# Patient Record
Sex: FEMALE | Race: AMERICAN INDIAN OR ALASKA NATIVE
[De-identification: names, ages, dates, MRNs, and addresses within clinical notes are randomized per-mention and may not be internally consistent; named-entity substitution may affect disease eponyms.]

---

## 2022-08-22 ENCOUNTER — HOSPITAL ENCOUNTER (OUTPATIENT)
Dept: HOSPITAL 5 - TRG | Age: 32
LOS: 1 days | Discharge: HOME | End: 2022-08-23
Attending: OBSTETRICS & GYNECOLOGY
Payer: MEDICAID

## 2022-08-22 DIAGNOSIS — Z3A.37: ICD-10-CM

## 2022-08-22 DIAGNOSIS — Z34.93: Primary | ICD-10-CM

## 2022-08-22 LAB
ALBUMIN SERPL-MCNC: 3.5 G/DL (ref 3.9–5)
BACTERIA #/AREA URNS HPF: (no result) /HPF
BASOPHILS # (AUTO): 0 K/MM3 (ref 0–0.1)
BASOPHILS NFR BLD AUTO: 0.4 % (ref 0–1.8)
BUN SERPL-MCNC: 8 MG/DL (ref 7–17)
CALCIUM SERPL-MCNC: 8.9 MG/DL (ref 8.4–10.2)
CREATININE,URINE: 122.4 MG/DL (ref 0.1–20)
EOSINOPHIL # BLD AUTO: 0.1 K/MM3 (ref 0–0.4)
EOSINOPHIL NFR BLD AUTO: 1 % (ref 0–4.3)
HCT VFR BLD CALC: 33.5 % (ref 30.3–42.9)
HEMOLYSIS INDEX: 14
HGB BLD-MCNC: 11.9 GM/DL (ref 10.1–14.3)
LYMPHOCYTES # BLD AUTO: 2.4 K/MM3 (ref 1.2–5.4)
LYMPHOCYTES NFR BLD AUTO: 31.6 % (ref 13.4–35)
MCHC RBC AUTO-ENTMCNC: 36 % (ref 30–34)
MCV RBC AUTO: 82 FL (ref 79–97)
MONOCYTES # (AUTO): 0.7 K/MM3 (ref 0–0.8)
MONOCYTES % (AUTO): 9.7 % (ref 0–7.3)
MUCOUS THREADS #/AREA URNS HPF: (no result) /HPF
PLATELET # BLD: 161 K/MM3 (ref 140–440)
RBC # BLD AUTO: 4.11 M/MM3 (ref 3.65–5.03)
RBC #/AREA URNS HPF: < 1 /HPF (ref 0–6)
WBC #/AREA URNS HPF: 1 /HPF (ref 0–6)

## 2022-08-22 PROCEDURE — 81001 URINALYSIS AUTO W/SCOPE: CPT

## 2022-08-22 PROCEDURE — 80053 COMPREHEN METABOLIC PANEL: CPT

## 2022-08-22 PROCEDURE — 84550 ASSAY OF BLOOD/URIC ACID: CPT

## 2022-08-22 PROCEDURE — 76816 OB US FOLLOW-UP PER FETUS: CPT

## 2022-08-22 PROCEDURE — 85025 COMPLETE CBC W/AUTO DIFF WBC: CPT

## 2022-08-22 PROCEDURE — 82570 ASSAY OF URINE CREATININE: CPT

## 2022-08-22 PROCEDURE — 36415 COLL VENOUS BLD VENIPUNCTURE: CPT

## 2022-08-22 PROCEDURE — 59025 FETAL NON-STRESS TEST: CPT

## 2022-08-22 PROCEDURE — 84156 ASSAY OF PROTEIN URINE: CPT

## 2022-08-22 NOTE — ULTRASOUND REPORT
US OB follow up



INDICATION / CLINICAL INFORMATION: Contractions, Pain clinical indication for estimated fetal weight 
and amniotic fluid index.



COMPARISON: None available.



TECHNIQUE: Using a transcutaneous probe, multiple grayscale, color Doppler, and spectral Doppler imag
es of the uterus and fetus were captured and stored.

 

FINDINGS:



A single cephalic fetus is present. Heart rate 161 bpm.



Amniotic fluid index is within normal limits measuring 9.5 cm.



Biparietal Diameter = 9.7 cm = 39, 4 weeks, days

Head Circumference = 34.5 cm = 39, 6 weeks, days

Abdominal Circumference = 33.5 cm = 37, 2 weeks, days

Femur Length = 6.8 cm = 35, 1 weeks, days

Average Ultrasound Age (AUA) = 38, 0 weeks, days. EDC 9/5/2022.



Clinical history gestational age based on LMP of 11/30/2021 is 37 weeks 6 days.



Estimated fetal weight = 3182 g. Growth percentile 48%..



IMPRESSION:

1. Single living fetus with estimated fetal weight and normal amniotic fluid index as detailed.



Signer Name: Shan Oreilly II, MD 

Signed: 8/22/2022 11:31 PM

Workstation Name: [id number]

## 2022-08-23 VITALS — SYSTOLIC BLOOD PRESSURE: 111 MMHG | DIASTOLIC BLOOD PRESSURE: 60 MMHG

## 2022-08-23 LAB
ALT SERPL-CCNC: 12 UNITS/L (ref 7–56)
BUN/CREAT SERPL: 11 %

## 2022-08-31 ENCOUNTER — HOSPITAL ENCOUNTER (INPATIENT)
Dept: HOSPITAL 5 - TRG | Age: 32
LOS: 2 days | Discharge: HOME | End: 2022-09-02
Attending: OBSTETRICS & GYNECOLOGY | Admitting: OBSTETRICS & GYNECOLOGY
Payer: MEDICAID

## 2022-08-31 DIAGNOSIS — Z20.822: ICD-10-CM

## 2022-08-31 DIAGNOSIS — Z3A.39: ICD-10-CM

## 2022-08-31 LAB
ALT SERPL-CCNC: 10 UNITS/L (ref 7–56)
BACTERIA #/AREA URNS HPF: (no result) /HPF
HCT VFR BLD CALC: 34.1 % (ref 30.3–42.9)
HGB BLD-MCNC: 12.2 GM/DL (ref 10.1–14.3)
MCHC RBC AUTO-ENTMCNC: 36 % (ref 30–34)
MCV RBC AUTO: 82 FL (ref 79–97)
MUCOUS THREADS #/AREA URNS HPF: (no result) /HPF
PLATELET # BLD: 175 K/MM3 (ref 140–440)
RBC # BLD AUTO: 4.15 M/MM3 (ref 3.65–5.03)
RBC #/AREA URNS HPF: 2 /HPF (ref 0–6)
URATE SERPL-MCNC: 4.3 MG/DL (ref 3.5–7.6)
WBC #/AREA URNS HPF: 2 /HPF (ref 0–6)

## 2022-08-31 PROCEDURE — 85018 HEMOGLOBIN: CPT

## 2022-08-31 PROCEDURE — U0003 INFECTIOUS AGENT DETECTION BY NUCLEIC ACID (DNA OR RNA); SEVERE ACUTE RESPIRATORY SYNDROME CORONAVIRUS 2 (SARS-COV-2) (CORONAVIRUS DISEASE [COVID-19]), AMPLIFIED PROBE TECHNIQUE, MAKING USE OF HIGH THROUGHPUT TECHNOLOGIES AS DESCRIBED BY CMS-2020-01-R: HCPCS

## 2022-08-31 PROCEDURE — 85027 COMPLETE CBC AUTOMATED: CPT

## 2022-08-31 PROCEDURE — 86901 BLOOD TYPING SEROLOGIC RH(D): CPT

## 2022-08-31 PROCEDURE — 36415 COLL VENOUS BLD VENIPUNCTURE: CPT

## 2022-08-31 PROCEDURE — 83615 LACTATE (LD) (LDH) ENZYME: CPT

## 2022-08-31 PROCEDURE — 84550 ASSAY OF BLOOD/URIC ACID: CPT

## 2022-08-31 PROCEDURE — 84460 ALANINE AMINO (ALT) (SGPT): CPT

## 2022-08-31 PROCEDURE — 85014 HEMATOCRIT: CPT

## 2022-08-31 PROCEDURE — 82570 ASSAY OF URINE CREATININE: CPT

## 2022-08-31 PROCEDURE — 84450 TRANSFERASE (AST) (SGOT): CPT

## 2022-08-31 PROCEDURE — 76816 OB US FOLLOW-UP PER FETUS: CPT

## 2022-08-31 PROCEDURE — 82565 ASSAY OF CREATININE: CPT

## 2022-08-31 PROCEDURE — 84156 ASSAY OF PROTEIN URINE: CPT

## 2022-08-31 PROCEDURE — 86900 BLOOD TYPING SEROLOGIC ABO: CPT

## 2022-08-31 PROCEDURE — 86850 RBC ANTIBODY SCREEN: CPT

## 2022-08-31 PROCEDURE — 59200 INSERT CERVICAL DILATOR: CPT

## 2022-08-31 PROCEDURE — 81001 URINALYSIS AUTO W/SCOPE: CPT

## 2022-08-31 NOTE — ULTRASOUND REPORT
ULTRASOUND OBSTETRIC 



Indication:

Fetal well-being. Maternal headache.



COMPARISON: 8/22/2022



Findings:

There is a single intrauterine pregnancy. 



BPD = 9.5 cm = 38 weeks, 5 day(s).

Head circumference = 34.5 cm = 39 weeks, 6 day(s).

Abdominal circumference = 36.0 cm = 40 weeks, 0 day(s).

Femur length = 7.0 cm = 36 weeks, 2 day(s).



Overall estimated sonographic age = 38 weeks, 5 day(s).



Fetal heart rate is 160  beats per minute. 

Estimated fetal birth weight is 3669  grams



Fetal position is cephalic. 





Placenta is posterior  . 

Amniotic fluid volume appears normal. 





Impression:

1. Single living intrauterine pregnancy with estimated sonographic age of 38 weeks, 5  day(s).

2. No sonographic abnormality identified.



Signer Name: Blue Alvarado MD 

Signed: 8/31/2022 11:41 PM

Workstation Name: [employer]

## 2022-09-01 LAB
CREATININE,URINE: 180 MG/DL (ref 0.1–20)
HCT VFR BLD CALC: 31.7 % (ref 30.3–42.9)
HGB BLD-MCNC: 11 GM/DL (ref 10.1–14.3)
MCHC RBC AUTO-ENTMCNC: 35 % (ref 30–34)
MCV RBC AUTO: 83 FL (ref 79–97)
PLATELET # BLD: 150 K/MM3 (ref 140–440)
RBC # BLD AUTO: 3.82 M/MM3 (ref 3.65–5.03)

## 2022-09-01 PROCEDURE — 3E0R3BZ INTRODUCTION OF ANESTHETIC AGENT INTO SPINAL CANAL, PERCUTANEOUS APPROACH: ICD-10-PCS | Performed by: OBSTETRICS & GYNECOLOGY

## 2022-09-01 PROCEDURE — 10907ZC DRAINAGE OF AMNIOTIC FLUID, THERAPEUTIC FROM PRODUCTS OF CONCEPTION, VIA NATURAL OR ARTIFICIAL OPENING: ICD-10-PCS | Performed by: OBSTETRICS & GYNECOLOGY

## 2022-09-01 PROCEDURE — 00HU33Z INSERTION OF INFUSION DEVICE INTO SPINAL CANAL, PERCUTANEOUS APPROACH: ICD-10-PCS | Performed by: OBSTETRICS & GYNECOLOGY

## 2022-09-01 RX ADMIN — SODIUM CHLORIDE, SODIUM LACTATE, POTASSIUM CHLORIDE, AND CALCIUM CHLORIDE SCH MLS/HR: .6; .31; .03; .02 INJECTION, SOLUTION INTRAVENOUS at 04:10

## 2022-09-01 RX ADMIN — SODIUM CHLORIDE, SODIUM LACTATE, POTASSIUM CHLORIDE, AND CALCIUM CHLORIDE SCH MLS/HR: .6; .31; .03; .02 INJECTION, SOLUTION INTRAVENOUS at 06:39

## 2022-09-01 RX ADMIN — SODIUM CHLORIDE, SODIUM LACTATE, POTASSIUM CHLORIDE, AND CALCIUM CHLORIDE SCH MLS/HR: .6; .31; .03; .02 INJECTION, SOLUTION INTRAVENOUS at 09:03

## 2022-09-01 RX ADMIN — DOCUSATE SODIUM SCH MG: 100 CAPSULE, LIQUID FILLED ORAL at 23:11

## 2022-09-01 RX ADMIN — SENNOSIDES AND DOCUSATE SODIUM SCH TAB: 50; 8.6 TABLET ORAL at 18:40

## 2022-09-01 RX ADMIN — SODIUM CHLORIDE, SODIUM LACTATE, POTASSIUM CHLORIDE, AND CALCIUM CHLORIDE SCH MLS/HR: .6; .31; .03; .02 INJECTION, SOLUTION INTRAVENOUS at 04:34

## 2022-09-01 RX ADMIN — SODIUM CHLORIDE, SODIUM LACTATE, POTASSIUM CHLORIDE, AND CALCIUM CHLORIDE SCH MLS/HR: .6; .31; .03; .02 INJECTION, SOLUTION INTRAVENOUS at 08:05

## 2022-09-01 RX ADMIN — IBUPROFEN SCH MG: 600 TABLET, FILM COATED ORAL at 23:11

## 2022-09-01 RX ADMIN — IBUPROFEN SCH: 600 TABLET, FILM COATED ORAL at 18:44

## 2022-09-01 NOTE — ANESTHESIA CONSULTATION
Anesthesia Consult and Med Hx


Date of service: 09/01/22





- Airway


Anesthetic Teeth Evaluation: Good


ROM Head & Neck: Adequate


Mental/Hyoid Distance: Adequate


Mallampati Class: Class II


Intubation Access Assessment: Probably Good





- Pulmonary Exam


CTA: Yes





- Cardiac Exam


Cardiac Exam: RRR





- Pre-Operative Health Status


ASA Pre-Surgery Classification: ASA2


Proposed Anesthetic Plan: Epidural





- Pulmonary


Hx Smoking: No


Hx Asthma: No


Hx Respiratory Symptoms: No


SOB: No


COPD: No


Home Oxygen Therapy: No


Hx Pneumonia: No


Hx Sleep Apnea: No





- Cardiovascular System


Hx Hypertension: No


Hx Coronary Artery Disease: No


Hx Heart Attack/AMI: No


Hx Angina: No


Hx Percutaneous Transluminal Coronary Angioplasty (PTCA): No


Hx Cardia Arrhythmia: No


Hx Pacemaker: No


Hx Internal Defibrillator: No


Hx Valvular Heart Disease: No


Hx Heart Murmur: No


Hx Peripheral Vascular Disease: No





- Central Nervous System


Hx Neuromuscular Disorder: No


Hx Seizures: No


CVA: No


Hx Psychiatric Problems: No





- Gastrointestinal


Hx Ulcer: No


Hx Gastroesophageal Reflux Disease: No





- Endocrine


Hx Renal Disease: No


Hx End Stage Renal Disease: No


Hx Cirrhosis: No


Hx Liver Disease: No


Hx Insulin Dependent Diabetes: No


Hx Non-Insulin Dependent Diabetes: No


Hx Thyroid Disease: No


Hx Hypothyroidism: No


Hx Hyperthyroidism: No





- Hematic


Hx Anemia: No


Hx Sickle Cell Disease: No





- Other Systems


Hx Alcohol Use: No


Hx Substance Use: No


Hx Cancer: No


Hx Obesity: No

## 2022-09-01 NOTE — ANESTHESIA DAY OF SURGERY
Anesthesia Day of Surgery





- Day of Surgery


Patient Examined: Yes


Patient H&P Reviewed: Yes


Patient is NPO: Yes


Beta Blockers: No


Cardiac Clearance: No


Pulmonary Clearance: No


Freddy's Test: N/A

## 2022-09-01 NOTE — EVENT NOTE
Date: 09/01/22





Patient is now post epidural, states much more comfortable now. VE:7/70/-1 

posterior. AROM clr with some old blood noted.

## 2022-09-01 NOTE — HISTORY AND PHYSICAL REPORT
History of Present Illness


Date of examination: 22


Date of admission: 


2022


Chief complaint: 





Blurry vision


History of present illness: 





31 y/o  at 39-2/7 weeks presents to OB triage reports that she had 

blurry vision earlier today and called her OB/GYN office at [facility] who advised her to go to [facility] for further 

evaluation management.





She denies headaches, diplopia, right upper quadrant pain, or scotomata.  She 

denies vaginal bleeding, leaking of fluid, or contractions.  There is good fetal

movement.





The patient had a blood pressure reading >=140/90 during a triage visit on 

2022.  Today in OB triage, the patient had a blood pressure reading 

>=140/90.  Urine protein creatinine ratio was normal and estimated 24 urine 

protein was 161 mg.





As such, this fulfills the contemporary criteria for gestational hypertension.  

Therefore, induction of labor is medically indicated at this gestational age per

ACOG Committee Opinion 831.





The patient is admitted to labor and delivery for induction of labor secondary 

to gestational hypertension.





Past History


Past Medical History: no pertinent history


Past Surgical History: no surgical history


Family/Genetic History: none


Social history: no significant social history





- Obstetrical History


Expected Date of Delivery: 22


Actual Gestation: 39 Week(s) 2 Day(s) 


: 4


Para: 2


Hx # Term Pregnancies: 2


Number of  Pregnancies: 0


Spontaneous Abortions: 1


Induced : 0


Number of Living Children: 2





Medications and Allergies


                                    Allergies











Allergy/AdvReac Type Severity Reaction Status Date / Time


 


No Known Allergies Allergy   Verified 22 22:13














- Vital Signs


Vital signs: 


                                   Vital Signs











Pulse BP


 


 90   138/89 


 


 22 18:40  22 18:40








                                        











Temp Pulse Resp BP Pulse Ox


 


 98.5 F   88   18   126/75   100 


 


 22 18:42  22 00:43  22 18:42  22 00:43  22 00:39














- Physical Exam


Breasts: Positive: normal


Cardiovascular: Regular rate


Lungs: Positive: Normal air movement


Abdomen: Positive: normal appearance


Genitourinary (Female): Positive: normal external genitalia, normal perenium


Vulva: both: normal


Vagina: Positive: normal moisture


Uterus: Positive: enlarged


Adnexa: both: normal


Anus/Rectum: Positive: normal perianal skin


Extremities: Positive: normal


Deep Tendon Reflex Grade: Normal +2





- Obstetrical


FHR: category 1


Cervical Dilatation: 1.5


Cervical Effacement Percentage: 40


Fetal station: -4


Uterine Contraction Pattern: Irregular





Results


Result Diagrams: 


                                 22 20:30





                                 22 20:30


                              Abnormal lab results











  22 Range/Units





  20:30 21:29 


 


MCHC  36 H   (30-34)  %


 


Urine Creatinine   180.0 H  (0.1-20.0)  mg/dL


 


Urine Total Protein   29 H  (5-11.8)  mg/dL








Urine Protein to Creatinine Ratio (UPCR)= 0.16


Estimated 24 hour urine protein= 161 mg





Ultrasound: report reviewed, image reviewed, other (OB Ultrasound Limited= 

SLIUP.  Vertex.  Posterior placenta.  EFW= 3669 g (68th %-ile).  SUE= 11.5 cm.)





Assessment and Plan





- Patient Problems


(1) 39 weeks gestation of pregnancy


Current Visit: Yes   Status: Acute   


Plan to address problem: 


Prenatal care is up-to-date.  The patient is GBS negative.








(2) Gestational hypertension, third trimester


Current Visit: Yes   Status: Acute   


Plan to address problem: 


The patient had a blood pressure reading >=140/90 during a triage visit on 

2022.  Today in OB triage, the patient had a blood pressure reading 

>=140/90.  Urine protein creatinine ratio was normal and estimated 24 urine 

protein was 161 mg.





As such, this fulfills the contemporary criteria for gestational hypertension.  

She denies headaches, diplopia, right upper quadrant pain, or scotomata.  She 

denies vaginal bleeding, leaking of fluid, or contractions.  There is good fetal

movement.





Therefore, induction of labor is medically indicated at this gestational age per

ACOG Committee Opinion 831.








(3) Encounter for induction of labor


Current Visit: Yes   Status: Acute   


Plan to address problem: 


Ripen cervix with oral Cytotec and place Cook's catheter to gentle traction.  

Augment cervical ripening with intravaginal [place].





Once Cook's catheter falls out, then artificially rupture membranes insert 

Pitocin if contractions or not adequate.

## 2022-09-01 NOTE — PROCEDURE NOTE
OB Delivery Note





- Delivery


Date of Delivery: 22


Surgeon: JACEY SEYMOUR


Estimated blood loss: 200cc





- Vaginal


Delivery presentation: vertex (@ 1535)


Delivery position: OA


Intrapartum events: none


Delivery induction: other (balloon)


Delivery augmentation: rupture of membranes (clear), pitocin


Delivery monitor: external FHT, external uterine


Route of delivery: 


Delivery placenta: spontaneous (1542)


Delivery cord: 3 umbilical vessels


Episiotomy: none


Delivery laceration: none


Anesthesia: epidural


Delivery comments: 





 now was induced for Gestational hypertension @39.2 weeks gestation. She was

induced with a  braga balloon and cytotec, then augmented by AROM and Pitocin. 

She received an epidural, become complete and 0 station. After 7 pushes, she 

delivered a viable baby boy on 22 @ 1535, 8/9 APGAR, 9.10 pounds, over an 

intact perineum. Infant was placed on mother's abdomen, delayed cord clamping, 

cord was cut by maternal grandmother. Placenta delivered spontaneously @1542. No

lacerations. QBL 200ml. both infant and mother stable.





- Infant


  ** A


Apgar at 1 minute: 8


Apgar at 5 minutes: 9


Infant Gender: Male (9.10 pounds)

## 2022-09-01 NOTE — EVENT NOTE
Date: 09/01/22





Checked on pt states feeling her contractions and pressure. VE: 8.5/90/-2, RN 

informed for bolus of her epidural.

## 2022-09-02 VITALS — SYSTOLIC BLOOD PRESSURE: 135 MMHG | DIASTOLIC BLOOD PRESSURE: 88 MMHG

## 2022-09-02 LAB
HCT VFR BLD CALC: 32 % (ref 30.3–42.9)
HGB BLD-MCNC: 10.8 GM/DL (ref 10.1–14.3)

## 2022-09-02 RX ADMIN — IBUPROFEN SCH MG: 600 TABLET, FILM COATED ORAL at 05:09

## 2022-09-02 RX ADMIN — SENNOSIDES AND DOCUSATE SODIUM SCH TAB: 50; 8.6 TABLET ORAL at 18:30

## 2022-09-02 RX ADMIN — IBUPROFEN SCH MG: 600 TABLET, FILM COATED ORAL at 18:31

## 2022-09-02 RX ADMIN — DOCUSATE SODIUM SCH MG: 100 CAPSULE, LIQUID FILLED ORAL at 10:27

## 2022-09-02 RX ADMIN — IBUPROFEN SCH MG: 600 TABLET, FILM COATED ORAL at 10:27

## 2022-09-02 RX ADMIN — SENNOSIDES AND DOCUSATE SODIUM SCH: 50; 8.6 TABLET ORAL at 05:08

## 2022-09-02 NOTE — POST ANESTHESIA EVALUATION
- Post Anesthesia Evaluation


Patient Participated: Yes


Airway Patent: Yes


Stable Respiratory Function: Yes


Nausea/Vomiting: No


Temp > 96.8F: Yes


Pain Manageable: Yes


Adequeate Hydration: Yes


Anesthesia Complications: No


Block Receding Appropriately: Yes


Patient on Ventilator: No unable to perform

## 2022-09-02 NOTE — DISCHARGE SUMMARY
Providers





- Providers


Date of Admission: 


22 02:05





Date of discharge: 22


Attending physician: 


LISSA GARCIA





Primary care physician: 


LISSA GARCIA








Hospitalization


Reason for admission: induction of labor (GHTN)


Delivery: 


Episiotomy: none


Laceration: none


Other postpartum procedures: none


Postpartum complications: none


Discharge diagnosis: IUP at term delivered


[place] baby: male


Condition at discharge: Good


Disposition: 01 HOME / SELF CARE / HOMELESS





Plan





- Discharge Medications


Prescriptions: 


Ibuprofen [Motrin 600 MG tab] 800 mg PO Q8H #30 tablet





- Provider Discharge Summary


Activity: no sex for 6 weeks, no heavy lifting 4 weeks, no strenuous exercise


Diet: routine


Instructions: routine


Additional instructions: 


[]  Smoking cessation referral if applicable(refer to patient education folder 

for contact #)


[]  Refer to [facility] Booklet








Call your doctor immediately for:


* Fever > 100.5


* Heavy vaginal bleeding ( >1 pad per hour)


* Severe persistent headache


* Shortness of breath


* Reddened, hot, painful area to leg or breast


* Drainage or odor from incision.





* 





- Follow up plan


Follow up: 


LISSA GARCIA MD [Primary Care Provider] - 6 Weeks

## 2022-09-02 NOTE — PROGRESS NOTE
Assessment and Plan





A:  


stable





P: continue PP care per unit protocol


 d/c home 





Subjective





- Subjective


Date of service: 22


Principal diagnosis: 


Patient reports: appetite normal, voiding normally, pain well controlled, amb

ulating normally


[place]: doing well





Objective





- Vital Signs


Latest vital signs: 


                                   Vital Signs











  Temp Pulse Resp BP BP Pulse Ox Pulse Ox


 


 22 08:18  97.5 F L  93 H  18  125/83   100 


 


 22 00:46  98.3 F  100 H  18  121/67   99 


 


 22 20:40  99.3 F  98 H  18   125/74  100 


 


 22 20:00        99


 


 22 18:05  98.6 F  102 H  20   137/82  100  100


 


 22 17:48   105 H   137/76   


 


 22 17:46   106 H   132/79   


 


 22 17:45   60     98 


 


 22 17:40   105 H     81 L 


 


 22 17:34   187 H     81 L 


 


 22 17:31   96 H   132/79   


 


 22 17:29       83 L 


 


 22 17:26   156 H     73 L 


 


 22 17:24   27 L     75 L 


 


 22 17:20   106 H     99 


 


 22 17:16   100 H   144/90   


 


 22 17:15       85 


 


 22 17:12       92 


 


 22 17:01   108 H   145/81   


 


 22 16:58       82 L 


 


 22 16:56   89   128/77   100 


 


 22 16:52   67     89 


 


 22 16:51   62     89 


 


 22 16:47       48 L 


 


 22 16:46   57 L   150/87   52 L 


 


 22 16:42       78 L 


 


 22 16:41   64     73 L 


 


 22 16:36   66     91 


 


 22 16:32   95 H   139/79   


 


 22 16:31   107 H     82 L 


 


 22 16:30   86     84 


 


 22 16:26   99 H     98 


 


 22 16:21   108 H     99 


 


 22 16:16   104 H   131/76   99 


 


 22 16:11   111 H     98 


 


 22 16:09   100 H   130/78   


 


 22 16:06   109 H     100 


 


 22 16:01   105 H   163/71   100 


 


 22 15:56  97.9 F  109 H  18    100 


 


 22 15:52   95 H   141/73   


 


 22 15:51   98 H     100 


 


 22 15:50   131 H   139/89   


 


 22 15:46   92 H     99 


 


 22 15:41   97 H     100 


 


 22 15:36   92 H   140/77   100 


 


 22 15:31   106 H     100 


 


 22 15:26   113 H     100 


 


 22 15:21   122 H     100 


 


 22 15:19   93 H   147/66   


 


 22 15:16   108 H     100 


 


 22 15:11   92 H     100 


 


 22 15:06   95 H     100 


 


 22 15:04   88   140/63   


 


 22 15:01   95 H     100 


 


 22 14:56   99 H     100 


 


 22 14:51   88     100 


 


 22 14:50   86   149/63   


 


 22 14:46   105 H     100 


 


 22 14:41   93 H     100 


 


 22 14:36   91 H     100 


 


 22 14:35   91 H   156/75   


 


 22 14:31   91 H     100 


 


 22 14:26   89     100 


 


 22 14:21   91 H   135/87   100 


 


 22 14:16   80     100 


 


 22 14:11   86     99 


 


 22 14:06   83     98 


 


 22 14:05   81   122/71   


 


 22 14:01  97.9 F  86  17    100 


 


 22 13:56   82     98 


 


 22 13:51   95 H     98 


 


 22 13:49   75   117/57   


 


 22 13:46   82     97 


 


 22 13:41   78     98 


 


 22 13:36   92 H     99 


 


 22 13:35   90   120/56   


 


 22 13:31   92 H     98 


 


 22 13:27   92 H     94 


 


 09/01/22 13:26   90     96 


 


 22 13:21   77     99 


 


 22 13:20   80   115/60   


 


 22 13:16   84     98 


 


 22 13:11   86     98 


 


 22 13:06   85     99 


 


 22 13:05   78   111/57   


 


 22 13:01   89     98 


 


 22 12:56   83     100 


 


 22 12:51   101 H     99 


 


 22 12:49   81   127/67   


 


 22 12:46   81     100 


 


 22 12:41   87     99 


 


 22 12:36   77   128/64   100 


 


 22 12:31   80     100 


 


 22 12:26   78     100 


 


 22 12:23   85     94 


 


 22 12:21   81     100 


 


 22 12:20   72   122/73   


 


 22 12:16   78     100 


 


 22 12:11   79     100 


 


 22 12:06   88   132/68   100 


 


 22 12:01   81     99 


 


 22 11:56   73     100 


 


 22 11:51   74   116/69   100 


 


 22 11:45   83     100 


 


 22 11:43  98.4 F   18    


 


 22 11:40   78     100 


 


 22 11:36   73   120/69   


 


 22 11:35   77     100 


 


 22 11:30   83     100 


 


 22 11:25   78     100 


 


 22 11:20   75     100 


 


 22 11:19   78   116/63   


 


 22 11:15   76     100 


 


 22 11:10   78     97 


 


 22 11:05   79     100 


 


 22 11:04   80   114/64   


 


 22 11:00   85     100 


 


 22 10:55   86     100 


 


 22 10:50   86     100 


 


 22 10:49   86   119/70   


 


 22 10:45   85     100 


 


 22 10:40   85     100 


 


 22 10:36   83   119/68   


 


 22 10:35   86     100 


 


 22 10:30   94 H     100 


 


 22 10:25   91 H     100 


 


 22 10:20   83     100 


 


 22 10:19   82   113/57   


 


 22 10:15   77     100 


 


 22 10:10   72     100 


 


 22 10:05   80     100 


 


 22 10:04   75   110/58   


 


 22 10:00   81     100 


 


 22 09:55   72     100 


 


 22 09:50   82     100 


 


 22 09:49   75   108/56   


 


 22 09:45   76     100 


 


 22 09:40   79     100 


 


 22 09:35   86     100 


 


 22 09:34   85   107/59   


 


 22 09:30   81     100 


 


 22 09:25   80     100 


 


 22 09:20   85     100 


 


 22 09:19   82   106/57   


 


 22 09:15   80     100 


 


 22 09:10   80     100 


 


 22 09:05   81     100 


 


 22 09:04   79   108/57   


 


 22 09:02   81   106/57   


 


 22 09:00   78   104/57   98 


 


 22 08:58   82   101/56   


 


 22 08:56   85   103/57   


 


 22 08:55   86     99 


 


 22 08:54   83   105/57   


 


 22 08:52   87   112/55   


 


 22 08:50   81   114/59   98 


 


 22 08:48   82   115/59   


 


 22 08:46   86   104/56   


 


 22 08:45   89     99 


 


 22 08:44   81   105/58   








                                Intake and Output











 22





 23:59 07:59 15:59


 


Intake Total 320 180 


 


Output Total 1050  


 


Balance -730 180 


 


Intake:   


 


  Oral 200  


 


  Intake, Free Water 120 180 


 


Output:   


 


  Urine 1050  


 


    Void 1050  


 


Other:   


 


  Total, Intake Amount 200  


 


  Total, Output Amount 300  


 


  # Voids   


 


    Void 1 1 


 


  Estimated Blood Loss 200  














- Exam


Breasts: Present: normal


Cardiovascular: Present: Regular rate


Lungs: Present: Clear to auscultation, Normal air movement


Abdomen: Present: normal appearance, soft


Uterus: Present: firm, fundal height below umbilicus


Extremities: Present: normal


Deep Tendon Reflex Grade: Normal +2





- Labs


Labs: 


                              Abnormal lab results











  22 Range/Units





  11:18 


 


MCHC  35 H  (30-34)  %